# Patient Record
Sex: MALE | Race: WHITE
[De-identification: names, ages, dates, MRNs, and addresses within clinical notes are randomized per-mention and may not be internally consistent; named-entity substitution may affect disease eponyms.]

---

## 2018-04-01 ENCOUNTER — HOSPITAL ENCOUNTER (EMERGENCY)
Dept: HOSPITAL 45 - C.EDB | Age: 53
Discharge: HOME | End: 2018-04-01
Payer: COMMERCIAL

## 2018-04-01 VITALS — DIASTOLIC BLOOD PRESSURE: 83 MMHG | OXYGEN SATURATION: 97 % | SYSTOLIC BLOOD PRESSURE: 137 MMHG | HEART RATE: 66 BPM

## 2018-04-01 VITALS
WEIGHT: 216.93 LBS | BODY MASS INDEX: 32.13 KG/M2 | WEIGHT: 216.93 LBS | BODY MASS INDEX: 32.13 KG/M2 | HEIGHT: 69.02 IN | HEIGHT: 69.02 IN

## 2018-04-01 VITALS — TEMPERATURE: 97.7 F

## 2018-04-01 DIAGNOSIS — M25.561: Primary | ICD-10-CM
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
## 2018-04-01 NOTE — DIAGNOSTIC IMAGING REPORT
RIGHT KNEE 3 VIEWS



CLINICAL HISTORY: Right knee pain.



FINDINGS: AP, crosstable lateral, and sunrise views of the right knee are

compared to study dated 11/14/2017. The skeletal structures are well

mineralized. No fracture is seen. The joint spaces are maintained. A joint

effusion is identified. A calcified fabella is incidentally noted. The overlying

soft tissues are within normal limits.



IMPRESSION: Joint effusion with no acute bony abnormality identified.







Electronically signed by:  Juwan Lockhart M.D.

4/1/2018 8:49 PM



Dictated Date/Time:  4/1/2018 8:48 PM

## 2018-04-01 NOTE — EMERGENCY ROOM VISIT NOTE
ED Visit Note


First contact with patient:  20:12


CHIEF COMPLAINT: Right knee pain 





HISTORY OF PRESENT ILLNESS:  This 53-year-old male patient presents to the 

emergency department, ambulatory, approximately 6 hours after sustaining an 

injury to the right knee while stepping up onto a 2 foot high ledge while using 

his right foot.  The patient does report a history of knee injury in October 

with suspected meniscal injury.  He was encouraged to use anti-inflammatory 

medications and follow-up with his primary care provider for possible physical 

therapy and orthopedics referral.  The patient never followed up, and states 

his symptoms have been better than initial, however continuing to be present.  

The patient denies any other injuries besides their knee.  The patient denies 

swelling or bruising.  There is pain in the lateral aspect of the knee joint.  

They rate the pain as sharp and 7/10.  The patient states they are able to walk 

on it, though walking is difficult.  No numbness or tingling.  No previous 

injuries to this knee.  No ankle, foot or hip pain.  The knee has not been 

locking up on the patient.





REVIEW OF SYSTEMS:   A 6 system review of systems was completed with positives 

and pertinent negatives listed in the HPI. 





ALLERGIES: None





MEDICATIONS: Multivitamin





PMH: None   





SOCIAL HISTORY: The patient lives locally with family.  He denies drug, alcohol

, tobacco use.





PHYSICAL EXAM: Vital Signs: Reviewed Nurse's notes, vital signs stable.  GENERAL

: This is a 53-year-old white male, no acute distress, but appears in pain, well

-developed, well-nourished.  MENTAL STATUS: Alert, oriented to person place and 

time, and cooperative.  MUSCULOSKELETAL:  The right knee is mildly swollen. 

There is no ecchymosis.  There is no joint effusion present. The patient is 

tender over the lateral aspect of the knee joint. There is mild lateral joint 

line tenderness. The patella does appropriately subluxate. Range of motion is 

full. Strength of the quads and hamstrings is 5/5. Florence's is positive for 

pain. Lachman's and Anterior Drawer tests are negative. There is discomfort, 

but no laxity with varus and valgus stressing.  The foot and toes are warm and 

well-perfused.  Dorsalis pedis pulse 2+.    Sensation to pain and light touch 

is intact.  Capillary refill less than 2 seconds.





RADIOLOGY:


RIGHT KNEE 3 VIEWS





CLINICAL HISTORY: Right knee pain.





FINDINGS: AP, crosstable lateral, and sunrise views of the right knee are


compared to study dated 11/14/2017. The skeletal structures are well


mineralized. No fracture is seen. The joint spaces are maintained. A joint


effusion is identified. A calcified fabella is incidentally noted. The overlying


soft tissues are within normal limits.





IMPRESSION: Joint effusion with no acute bony abnormality identified.











Electronically signed by:  Juwan Lockhart M.D.


4/1/2018 8:49 PM





Dictated Date/Time:  4/1/2018 8:48 PM





EMERGENCY DEPARTMENT COURSE:  I examined the patient.  X-rays of the right knee 

were reviewed by myself and read by radiology and reveal a small joint effusion

, but no acute bony abnormality.  The patient was placed in a knee immobilizer 

under my direction and the position was satisfactory.  The patient was 

instructed on the use of crutches.  The patient was discharged home in good 

condition.





I attest that I have personally reviewed the patient's current medication list. 


Blood Pressure Screening: Patient was found to have a slightly elevated blood 

pressure due to circumstances. I do not believe that the patient requires 

hypertension monitoring. 





Etiologies such as soft tissue injury, meniscal injury, fracture, dislocation, 

neurovascular compromise, compartment syndrome, as well as others were 

entertained.  








DIAGNOSIS: Right knee pain








The chart was completed utilizing Dragon Speech voice recognition software. 

Grammatical errors, random word insertions, pronoun errors, and incomplete 

sentences are an occasional consequence of this system due to software 

limitations, ambient noise, and hardware issues. Any formal questions or 

concerns about the content, text, or information contained within the body of 

this dictation should be directly addressed to the provider for clarification.


Current/Historical Medications


Scheduled


Naproxen (Naprosyn), 500 MG PO BID





Allergies


Coded Allergies:  


     No Known Allergies (Unverified , ?, 4/1/18)





Vital Signs











  Date Time  Temp Pulse Resp B/P (MAP) Pulse Ox O2 Delivery O2 Flow Rate FiO2


 


4/1/18 21:41  66 18 137/83 97   


 


4/1/18 20:08 36.5 77 16 163/95 97 Room Air  











Departure Information


Impression





 Primary Impression:  


 Right knee pain





Dispostion


Home / Self-Care





Condition


GOOD





Prescriptions





Naproxen (Naprosyn) 500 Mg Tab


500 MG PO BID, #60 TAB


   Prov: Larissa Pacheco, DEANNA         4/1/18





Referrals


Vitor Whitman M.D. (PCP)








Jluis Nation D.O.





Patient Instructions


ED Immobilizer Knee, ED Knee Pain [facility], [facility]





Additional Instructions





You have been treated in the Emergency Department for Knee Pain. 





For pain control, you can use the following over-the-counter medicines (if >13 yo):


Naproxen 500 mg every 12 hours as needed for pain.  Take with food.  Prolonged 

inappropriate use can lead to stomach upset or ulcers. 


(AND/OR)


Acetaminophen(Tylenol) may be used for fever or pain.  Use 1000mg every six 

hours as needed.  Avoid using more than 3000mg in a 24 hour period.  





If this is a recent injury (<24 hrs), ice can be applied to the area of pain 

for the first 3 days to help decrease pain and inflammation. Ice massages can 

be performed by freezing water in a paper cup, peeling back the cup to expose 

the ice and then massaging over the affected area.





You have been provided the number for an Orthopaedic Surgeon. You should call 

this number as soon as possible to establish a follow-up visit from today's 

Emergency Department visit.





[Keep the knee brace in place until cleared by Orthopedics or you are able to 

bear weight without discomfort. Use the crutches you have been provided to keep 

ALL weight off of the knee until weight bearing is tolerable.





Return to the Emergency Department if your current symptoms worsen despite 

treatment course outlined above.





Problem Qualifiers








 Primary Impression:  


 Right knee pain


 Chronicity:  acute  Qualified Codes:  M25.561 - Pain in right knee

## 2018-04-03 ENCOUNTER — HOSPITAL ENCOUNTER (OUTPATIENT)
Dept: HOSPITAL 45 - C.RDSM | Age: 53
Discharge: HOME | End: 2018-04-03
Attending: ORTHOPAEDIC SURGERY
Payer: COMMERCIAL

## 2018-04-03 DIAGNOSIS — M79.604: Primary | ICD-10-CM

## 2018-04-09 ENCOUNTER — HOSPITAL ENCOUNTER (OUTPATIENT)
Dept: HOSPITAL 45 - C.MRI | Age: 53
Discharge: HOME | End: 2018-04-09
Attending: ORTHOPAEDIC SURGERY
Payer: COMMERCIAL

## 2018-04-09 DIAGNOSIS — S83.281A: Primary | ICD-10-CM
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
## 2018-04-09 DIAGNOSIS — S83.241A: ICD-10-CM

## 2018-04-09 DIAGNOSIS — M25.461: ICD-10-CM

## 2018-04-09 NOTE — DIAGNOSTIC IMAGING REPORT
R LOWER EXT JOINT WITHOUT



CLINICAL HISTORY: 53 years-old Male with RT KNEE PAIN.  Acute lateral right knee

pain with concern for meniscal tear.



COMPARISON: Right knee radiographs 4/1/2018



TECHNIQUE: Multiplanar, multisequence MRI of the right knee was performed

without intravenous contrast.



FINDINGS: 



MENISCI: Multidirectional increased T2 signal involves the lateral meniscus

body, anterior horn, posterior junction and posterior horn with areas of linear

increased signal extending to the superior articular surfaces nicely seen on

image 7 of series 7. Additionally, there is increased linear signal extending

into the posterior meniscal root compatible with tear extension. There is

suggestion of a flipped meniscal fragment adjacent to the posterior horn lateral

meniscus, image 23 series 6.



There is a subtle area of increased PD signal involving the posterior horn

medial meniscus extending towards the inferior articular surface, image 18

series 7 and image 22 series 6 which is equivocal for a small meniscal tear. No

displaced fragment or parameniscal cyst identified.



CRUCIATE LIGAMENTS: The anterior and posterior cruciate ligaments are normal in

signal, morphology and course.



COLLATERAL LIGAMENTS: The popliteus tendon, biceps femoris tendon, fibular

collateral ligament and iliotibial band are intact.  The superficial and deep

components of the medial collateral ligament are intact. Trace fluid is noted

both superficial and deep to the intact MCL, likely reactive.



EXTENSOR MECHANISM: The quadriceps and patellar tendons are intact. The medial

and lateral patellar retinacula are intact.



KNEE JOINT: There is a moderate to large knee joint effusion. No intra-articular

loose body identified. There is only minimal tricompartmental joint space

narrowing with low-grade chondral thinning. No osteochondral defect or

high-grade chondromalacia identified.



BONE MARROW: There is mild bone marrow edema involving the posterior aspect of

the lateral tibial plateau nicely seen on image 5 of series 8 without associated

fracture identified, likely reactive.



SOFT TISSUES: Mild subcutaneous edema about the knee. Trace Borrero's cyst. 



IMPRESSION: 

1. Complex tear of the lateral meniscus as above with extension into the

posterior meniscal root. Displaced meniscal fragment is noted adjacent to the

posterior horn.

2. Mild bone marrow edema of the posterior aspect of the lateral tibial plateau

without fracture is likely reactive.

3. Equivocal small tear of the posterior horn medial meniscus as above.

4. Moderate to large knee joint effusion.

5. Only minimal tricompartmental osteoarthritis without high-grade

chondromalacia identified.







The above report was generated using voice recognition software. It may contain

grammatical, syntax or spelling errors.









Electronically signed by:  Porter Oneal M.D.

4/9/2018 7:42 AM



Dictated Date/Time:  4/9/2018 7:23 AM